# Patient Record
Sex: FEMALE | Race: WHITE | URBAN - METROPOLITAN AREA
[De-identification: names, ages, dates, MRNs, and addresses within clinical notes are randomized per-mention and may not be internally consistent; named-entity substitution may affect disease eponyms.]

---

## 2017-07-06 ENCOUNTER — EMERGENCY (EMERGENCY)
Facility: HOSPITAL | Age: 72
LOS: 1 days | Discharge: HOME | End: 2017-07-06
Admitting: EMERGENCY MEDICINE
Payer: MEDICARE

## 2017-07-06 VITALS — WEIGHT: 151.9 LBS | HEIGHT: 60 IN

## 2017-07-06 VITALS
DIASTOLIC BLOOD PRESSURE: 103 MMHG | SYSTOLIC BLOOD PRESSURE: 169 MMHG | RESPIRATION RATE: 16 BRPM | TEMPERATURE: 98 F | HEART RATE: 107 BPM | OXYGEN SATURATION: 100 %

## 2017-07-06 DIAGNOSIS — Z79.899 OTHER LONG TERM (CURRENT) DRUG THERAPY: ICD-10-CM

## 2017-07-06 DIAGNOSIS — E78.00 PURE HYPERCHOLESTEROLEMIA, UNSPECIFIED: ICD-10-CM

## 2017-07-06 DIAGNOSIS — R07.9 CHEST PAIN, UNSPECIFIED: ICD-10-CM

## 2017-07-06 DIAGNOSIS — I10 ESSENTIAL (PRIMARY) HYPERTENSION: ICD-10-CM

## 2017-07-06 DIAGNOSIS — I25.10 ATHEROSCLEROTIC HEART DISEASE OF NATIVE CORONARY ARTERY WITHOUT ANGINA PECTORIS: ICD-10-CM

## 2017-07-06 DIAGNOSIS — R00.2 PALPITATIONS: ICD-10-CM

## 2017-07-06 LAB
CK SERPL-CCNC: 138 U/L — SIGNIFICANT CHANGE UP (ref 26–192)
TROPONIN I SERPL-MCNC: <0.015 NG/ML — SIGNIFICANT CHANGE UP (ref 0.01–0.04)

## 2017-07-06 PROCEDURE — 71010: CPT | Mod: 26

## 2017-07-06 PROCEDURE — 93010 ELECTROCARDIOGRAM REPORT: CPT

## 2017-07-06 PROCEDURE — 99285 EMERGENCY DEPT VISIT HI MDM: CPT

## 2017-07-06 RX ORDER — ASPIRIN/CALCIUM CARB/MAGNESIUM 324 MG
162 TABLET ORAL ONCE
Qty: 0 | Refills: 0 | Status: DISCONTINUED | OUTPATIENT
Start: 2017-07-06 | End: 2017-07-10

## 2017-07-06 RX ORDER — SODIUM CHLORIDE 9 MG/ML
3 INJECTION INTRAMUSCULAR; INTRAVENOUS; SUBCUTANEOUS ONCE
Qty: 0 | Refills: 0 | Status: DISCONTINUED | OUTPATIENT
Start: 2017-07-06 | End: 2017-07-10

## 2017-09-06 ENCOUNTER — INPATIENT (INPATIENT)
Facility: HOSPITAL | Age: 72
LOS: 0 days | Discharge: HOME HEALTH | End: 2017-09-07
Attending: ORTHOPAEDIC SURGERY | Admitting: ORTHOPAEDIC SURGERY
Payer: MEDICARE

## 2017-09-06 VITALS — WEIGHT: 169.98 LBS | HEIGHT: 66 IN

## 2017-09-06 LAB
HCT VFR BLD CALC: 39.5 % — SIGNIFICANT CHANGE UP (ref 34.5–45)
HGB BLD-MCNC: 13.7 G/DL — SIGNIFICANT CHANGE UP (ref 11.5–15.5)
MCHC RBC-ENTMCNC: 29.2 PG — SIGNIFICANT CHANGE UP (ref 27–34)
MCHC RBC-ENTMCNC: 34.7 GM/DL — SIGNIFICANT CHANGE UP (ref 32–36)
MCV RBC AUTO: 84 FL — SIGNIFICANT CHANGE UP (ref 80–100)
PLATELET # BLD AUTO: 297 K/UL — SIGNIFICANT CHANGE UP (ref 150–400)
RBC # BLD: 4.7 M/UL — SIGNIFICANT CHANGE UP (ref 3.8–5.2)
RBC # FLD: 12.8 % — SIGNIFICANT CHANGE UP (ref 10.3–14.5)
WBC # BLD: 13.1 K/UL — HIGH (ref 3.8–10.5)
WBC # FLD AUTO: 13.1 K/UL — HIGH (ref 3.8–10.5)

## 2017-09-06 PROCEDURE — 71010: CPT | Mod: 26

## 2017-09-06 PROCEDURE — 99222 1ST HOSP IP/OBS MODERATE 55: CPT | Mod: 57

## 2017-09-06 PROCEDURE — 76377 3D RENDER W/INTRP POSTPROCES: CPT | Mod: 26

## 2017-09-06 PROCEDURE — 73590 X-RAY EXAM OF LOWER LEG: CPT | Mod: 26,RT

## 2017-09-06 PROCEDURE — 99285 EMERGENCY DEPT VISIT HI MDM: CPT

## 2017-09-06 PROCEDURE — 27530 TREAT KNEE FRACTURE: CPT | Mod: RT

## 2017-09-06 PROCEDURE — 73564 X-RAY EXAM KNEE 4 OR MORE: CPT | Mod: 26,RT

## 2017-09-06 PROCEDURE — 73610 X-RAY EXAM OF ANKLE: CPT | Mod: 26,RT

## 2017-09-06 PROCEDURE — 73700 CT LOWER EXTREMITY W/O DYE: CPT | Mod: 26,RT

## 2017-09-06 RX ORDER — METOPROLOL TARTRATE 50 MG
25 TABLET ORAL DAILY
Qty: 0 | Refills: 0 | Status: DISCONTINUED | OUTPATIENT
Start: 2017-09-06 | End: 2017-09-07

## 2017-09-06 RX ORDER — METOPROLOL TARTRATE 50 MG
1 TABLET ORAL
Qty: 0 | Refills: 0 | DISCHARGE
Start: 2017-09-06

## 2017-09-06 RX ORDER — SODIUM CHLORIDE 9 MG/ML
1000 INJECTION INTRAMUSCULAR; INTRAVENOUS; SUBCUTANEOUS
Qty: 0 | Refills: 0 | Status: DISCONTINUED | OUTPATIENT
Start: 2017-09-06 | End: 2017-09-07

## 2017-09-06 RX ORDER — PANTOPRAZOLE SODIUM 20 MG/1
40 TABLET, DELAYED RELEASE ORAL
Qty: 0 | Refills: 0 | Status: DISCONTINUED | OUTPATIENT
Start: 2017-09-06 | End: 2017-09-07

## 2017-09-06 RX ORDER — LOSARTAN POTASSIUM 100 MG/1
100 TABLET, FILM COATED ORAL DAILY
Qty: 0 | Refills: 0 | Status: DISCONTINUED | OUTPATIENT
Start: 2017-09-06 | End: 2017-09-07

## 2017-09-06 RX ADMIN — Medication 25 MILLIGRAM(S): at 22:24

## 2017-09-06 NOTE — ED ADULT NURSE REASSESSMENT NOTE - NS ED NURSE REASSESS COMMENT FT1
Received report from Annelise IRIZARRY. Patient resting comfortably,  at bedside. Seen by ortho. Awaiting admission orders. Will continue to monitor.

## 2017-09-06 NOTE — ED STATDOCS - PROGRESS NOTE DETAILS
signed Fabiana Huang PA-C Pt seen initially in intake by Dr Cote.   Pt here for right knee pain after fall at home from standing level a few hours ago. pt has partial paraplegia of bilateral lower extremities and uses crutches and a wheelchair. extensor mechanism intact. pain proximal region of right tibia. No obvious swelling/effusion. Plan xray, pt has appt with Dr rios tomorrow. Pt agrees with plan of  care. signed Fabiana Huang PA-C   Spoke to ortho resident, will see pt in ED. requests CT of lower leg. signed Fabiana Huang PA-C   Pt with ortho residents who request pt be admitted to Dr Alvarado service. Pt agrees with admission and plan of care. As per ortho, no labs at this time. signed Fabiana Huang PA-C  +tibial plateau fx.  Spoke to ortho resident, will see pt in ED. requests CT of lower leg.
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
## 2017-09-07 VITALS
RESPIRATION RATE: 18 BRPM | HEART RATE: 100 BPM | OXYGEN SATURATION: 98 % | DIASTOLIC BLOOD PRESSURE: 67 MMHG | TEMPERATURE: 99 F | SYSTOLIC BLOOD PRESSURE: 98 MMHG

## 2017-09-07 LAB
ANION GAP SERPL CALC-SCNC: 10 MMOL/L — SIGNIFICANT CHANGE UP (ref 5–17)
ANION GAP SERPL CALC-SCNC: 9 MMOL/L — SIGNIFICANT CHANGE UP (ref 5–17)
APTT BLD: 34.6 SEC — SIGNIFICANT CHANGE UP (ref 27.5–37.4)
BLD GP AB SCN SERPL QL: SIGNIFICANT CHANGE UP
BUN SERPL-MCNC: 17 MG/DL — SIGNIFICANT CHANGE UP (ref 7–23)
BUN SERPL-MCNC: 18 MG/DL — SIGNIFICANT CHANGE UP (ref 7–23)
CALCIUM SERPL-MCNC: 8.9 MG/DL — SIGNIFICANT CHANGE UP (ref 8.5–10.1)
CALCIUM SERPL-MCNC: 9.1 MG/DL — SIGNIFICANT CHANGE UP (ref 8.5–10.1)
CHLORIDE SERPL-SCNC: 110 MMOL/L — HIGH (ref 96–108)
CHLORIDE SERPL-SCNC: 110 MMOL/L — HIGH (ref 96–108)
CO2 SERPL-SCNC: 21 MMOL/L — LOW (ref 22–31)
CO2 SERPL-SCNC: 22 MMOL/L — SIGNIFICANT CHANGE UP (ref 22–31)
CREAT SERPL-MCNC: 0.47 MG/DL — LOW (ref 0.5–1.3)
CREAT SERPL-MCNC: 0.57 MG/DL — SIGNIFICANT CHANGE UP (ref 0.5–1.3)
GLUCOSE SERPL-MCNC: 114 MG/DL — HIGH (ref 70–99)
GLUCOSE SERPL-MCNC: 119 MG/DL — HIGH (ref 70–99)
HCT VFR BLD CALC: 36.5 % — SIGNIFICANT CHANGE UP (ref 34.5–45)
HGB BLD-MCNC: 12.4 G/DL — SIGNIFICANT CHANGE UP (ref 11.5–15.5)
INR BLD: 1.12 RATIO — SIGNIFICANT CHANGE UP (ref 0.88–1.16)
MCHC RBC-ENTMCNC: 29 PG — SIGNIFICANT CHANGE UP (ref 27–34)
MCHC RBC-ENTMCNC: 33.9 GM/DL — SIGNIFICANT CHANGE UP (ref 32–36)
MCV RBC AUTO: 85.4 FL — SIGNIFICANT CHANGE UP (ref 80–100)
PLATELET # BLD AUTO: 258 K/UL — SIGNIFICANT CHANGE UP (ref 150–400)
POTASSIUM SERPL-MCNC: 3.5 MMOL/L — SIGNIFICANT CHANGE UP (ref 3.5–5.3)
POTASSIUM SERPL-MCNC: 3.7 MMOL/L — SIGNIFICANT CHANGE UP (ref 3.5–5.3)
POTASSIUM SERPL-SCNC: 3.5 MMOL/L — SIGNIFICANT CHANGE UP (ref 3.5–5.3)
POTASSIUM SERPL-SCNC: 3.7 MMOL/L — SIGNIFICANT CHANGE UP (ref 3.5–5.3)
PROTHROM AB SERPL-ACNC: 12.1 SEC — SIGNIFICANT CHANGE UP (ref 9.8–12.7)
RBC # BLD: 4.28 M/UL — SIGNIFICANT CHANGE UP (ref 3.8–5.2)
RBC # FLD: 12.8 % — SIGNIFICANT CHANGE UP (ref 10.3–14.5)
SODIUM SERPL-SCNC: 141 MMOL/L — SIGNIFICANT CHANGE UP (ref 135–145)
SODIUM SERPL-SCNC: 141 MMOL/L — SIGNIFICANT CHANGE UP (ref 135–145)
TYPE + AB SCN PNL BLD: SIGNIFICANT CHANGE UP
WBC # BLD: 10.1 K/UL — SIGNIFICANT CHANGE UP (ref 3.8–10.5)
WBC # FLD AUTO: 10.1 K/UL — SIGNIFICANT CHANGE UP (ref 3.8–10.5)

## 2017-09-07 PROCEDURE — 93010 ELECTROCARDIOGRAM REPORT: CPT

## 2017-09-07 RX ORDER — ASPIRIN/CALCIUM CARB/MAGNESIUM 324 MG
1 TABLET ORAL
Qty: 60 | Refills: 0
Start: 2017-09-07 | End: 2017-10-07

## 2017-09-07 RX ORDER — DOCUSATE SODIUM 100 MG
100 CAPSULE ORAL THREE TIMES A DAY
Qty: 0 | Refills: 0 | Status: DISCONTINUED | OUTPATIENT
Start: 2017-09-07 | End: 2017-09-07

## 2017-09-07 RX ORDER — POTASSIUM CHLORIDE 20 MEQ
40 PACKET (EA) ORAL ONCE
Qty: 0 | Refills: 0 | Status: COMPLETED | OUTPATIENT
Start: 2017-09-07 | End: 2017-09-07

## 2017-09-07 RX ORDER — DOCUSATE SODIUM 100 MG
1 CAPSULE ORAL
Qty: 14 | Refills: 0 | OUTPATIENT
Start: 2017-09-07 | End: 2017-09-14

## 2017-09-07 RX ORDER — DOCUSATE SODIUM 100 MG
1 CAPSULE ORAL
Qty: 14 | Refills: 0
Start: 2017-09-07 | End: 2017-09-14

## 2017-09-07 RX ORDER — ASPIRIN/CALCIUM CARB/MAGNESIUM 324 MG
325 TABLET ORAL
Qty: 0 | Refills: 0 | Status: DISCONTINUED | OUTPATIENT
Start: 2017-09-07 | End: 2017-09-07

## 2017-09-07 RX ORDER — OXYCODONE HYDROCHLORIDE 5 MG/1
10 TABLET ORAL EVERY 4 HOURS
Qty: 0 | Refills: 0 | Status: DISCONTINUED | OUTPATIENT
Start: 2017-09-07 | End: 2017-09-07

## 2017-09-07 RX ORDER — DIPHENHYDRAMINE HCL 50 MG
25 CAPSULE ORAL AT BEDTIME
Qty: 0 | Refills: 0 | Status: DISCONTINUED | OUTPATIENT
Start: 2017-09-07 | End: 2017-09-07

## 2017-09-07 RX ORDER — ACETAMINOPHEN 500 MG
650 TABLET ORAL EVERY 6 HOURS
Qty: 0 | Refills: 0 | Status: DISCONTINUED | OUTPATIENT
Start: 2017-09-07 | End: 2017-09-07

## 2017-09-07 RX ORDER — ASPIRIN/CALCIUM CARB/MAGNESIUM 324 MG
1 TABLET ORAL
Qty: 60 | Refills: 0 | OUTPATIENT
Start: 2017-09-07 | End: 2017-10-07

## 2017-09-07 RX ORDER — OXYCODONE HYDROCHLORIDE 5 MG/1
5 TABLET ORAL EVERY 4 HOURS
Qty: 0 | Refills: 0 | Status: DISCONTINUED | OUTPATIENT
Start: 2017-09-07 | End: 2017-09-07

## 2017-09-07 RX ORDER — HEPARIN SODIUM 5000 [USP'U]/ML
5000 INJECTION INTRAVENOUS; SUBCUTANEOUS EVERY 8 HOURS
Qty: 0 | Refills: 0 | Status: DISCONTINUED | OUTPATIENT
Start: 2017-09-07 | End: 2017-09-07

## 2017-09-07 RX ORDER — METOCLOPRAMIDE HCL 10 MG
10 TABLET ORAL EVERY 6 HOURS
Qty: 0 | Refills: 0 | Status: DISCONTINUED | OUTPATIENT
Start: 2017-09-07 | End: 2017-09-07

## 2017-09-07 RX ADMIN — Medication 25 MILLIGRAM(S): at 13:17

## 2017-09-07 RX ADMIN — LOSARTAN POTASSIUM 100 MILLIGRAM(S): 100 TABLET, FILM COATED ORAL at 06:39

## 2017-09-07 RX ADMIN — PANTOPRAZOLE SODIUM 40 MILLIGRAM(S): 20 TABLET, DELAYED RELEASE ORAL at 06:39

## 2017-09-07 NOTE — DISCHARGE NOTE ADULT - CONDITIONS AT DISCHARGE
Patient denied any pain. patient refused all her scheduled medications stating she will take her own medications at home.Patient seen by phisical therapy and orthopedic.Patient discharge to home awith

## 2017-09-07 NOTE — DISCHARGE NOTE ADULT - HOSPITAL COURSE
H&P:  This is a 71y/o female admitted to Orthopedics service with a right tibial plateau fracture s/p fall yesterday.     PAST MEDICAL & SURGICAL HISTORY:  HTN  HLD  hx Cauda Equina   Partial Paraplegia    MEDICATIONS  (STANDING):  metoprolol succinate ER 25 milliGRAM(s) Oral daily  losartan 100 milliGRAM(s) Oral daily  pantoprazole    Tablet 40 milliGRAM(s) Oral before breakfast  docusate sodium 100 milliGRAM(s) Oral three times a day  aspirin 325 milliGRAM(s) Oral two times a day    MEDICATIONS  (PRN):  acetaminophen   Tablet 650 milliGRAM(s) Oral every 6 hours PRN For Temp greater than 38 C (100.4 F) and headache  oxyCODONE    IR 10 milliGRAM(s) Oral every 4 hours PRN Severe Pain (7 - 10)  oxyCODONE    IR 5 milliGRAM(s) Oral every 4 hours PRN Moderate Pain (4 - 6)  metoclopramide Injectable 10 milliGRAM(s) IV Push every 6 hours PRN Nausea and/or Vomiting  diphenhydrAMINE   Capsule 25 milliGRAM(s) Oral at bedtime PRN Insomnia      Vital Signs Last 24 Hrs  T(C): 37.4 (09-07-17 @ 11:59), Max: 37.4 (09-07-17 @ 11:59)  T(F): 99.4 (09-07-17 @ 11:59), Max: 99.4 (09-07-17 @ 11:59)  HR: 100 (09-07-17 @ 11:59) (80 - 118)  BP: 98/67 (09-07-17 @ 11:59) (98/67 - 167/86)  BP(mean): --  RR: 18 (09-07-17 @ 11:59) (18 - 19)  SpO2: 98% (09-07-17 @ 11:59) (98% - 100%)                        12.4   10.1  )-----------( 258      ( 07 Sep 2017 05:00 )             36.5     PT/INR - ( 06 Sep 2017 23:50 )   PT: 12.1 sec;   INR: 1.12 ratio         PTT - ( 06 Sep 2017 23:50 )  PTT:34.6 sec    Hospital Course:  Patient presented to [facility] ED on 9/6 s/p fall with right knee pain found to have a right tibial plateau fracture. Fracture was determined to be nonoperative 2' minimal displacement of fracture and hx of partial paraplegia. Pt ambulates with B/L christine braces and B/L AFOs. Pt has a wheelchair at home and lives with her .    Pt was evaluated by PT and able to perform transfers, unable to ambulate. Pt is motivated to return home and says she has a wheelchair at home, able to transfer and her  is home to help assist her. Will discharge patient home with home care, home PT and request for HHA.    The orthopedic Attending is aware and agrees. H&P:  This is a 71y/o female admitted to Orthopedics service with a right tibial plateau fracture s/p fall yesterday.     PAST MEDICAL & SURGICAL HISTORY:  HTN  HLD  hx Cauda Equina   Partial Paraplegia    MEDICATIONS  (STANDING):  metoprolol succinate ER 25 milliGRAM(s) Oral daily  losartan 100 milliGRAM(s) Oral daily  pantoprazole    Tablet 40 milliGRAM(s) Oral before breakfast  docusate sodium 100 milliGRAM(s) Oral three times a day  aspirin 325 milliGRAM(s) Oral two times a day    MEDICATIONS  (PRN):  acetaminophen   Tablet 650 milliGRAM(s) Oral every 6 hours PRN For Temp greater than 38 C (100.4 F) and headache  oxyCODONE    IR 10 milliGRAM(s) Oral every 4 hours PRN Severe Pain (7 - 10)  oxyCODONE    IR 5 milliGRAM(s) Oral every 4 hours PRN Moderate Pain (4 - 6)  metoclopramide Injectable 10 milliGRAM(s) IV Push every 6 hours PRN Nausea and/or Vomiting  diphenhydrAMINE   Capsule 25 milliGRAM(s) Oral at bedtime PRN Insomnia      Vital Signs Last 24 Hrs  T(C): 37.4 (09-07-17 @ 11:59), Max: 37.4 (09-07-17 @ 11:59)  T(F): 99.4 (09-07-17 @ 11:59), Max: 99.4 (09-07-17 @ 11:59)  HR: 100 (09-07-17 @ 11:59) (80 - 118)  BP: 98/67 (09-07-17 @ 11:59) (98/67 - 167/86)  BP(mean): --  RR: 18 (09-07-17 @ 11:59) (18 - 19)  SpO2: 98% (09-07-17 @ 11:59) (98% - 100%)                        12.4   10.1  )-----------( 258      ( 07 Sep 2017 05:00 )             36.5     PT/INR - ( 06 Sep 2017 23:50 )   PT: 12.1 sec;   INR: 1.12 ratio         PTT - ( 06 Sep 2017 23:50 )  PTT:34.6 sec    Hospital Course:  Patient presented to [facility] ED on 9/6 s/p fall with right knee pain found to have a right tibial plateau fracture. Fracture was determined to be nonoperative 2' minimal displacement of fracture and hx of partial paraplegia. Pt ambulates with B/L christine braces and B/L AFOs. Pt has a wheelchair at home and lives with her .    Pt was evaluated by PT and able to perform transfers, unable to ambulate. Pt is motivated to return home and says she has a wheelchair at home, able to transfer and her  is home to help assist her. Will discharge patient home with home care, home PT and request for HHA. Pt will follow-up as outpatient with her private Orthopedist Dr. Padilla.    The orthopedic Attending, Dr. Martinez, aware and agrees.

## 2017-09-07 NOTE — PHYSICAL THERAPY INITIAL EVALUATION ADULT - GENERAL OBSERVATIONS, REHAB EVAL
R knee immobilizer / L wrist brace / Bilat AFO's  donned; pt rec'd in bed supine; denied pain; spouse present

## 2017-09-07 NOTE — DISCHARGE NOTE ADULT - PATIENT PORTAL LINK FT
“You can access the [employer] Patient Portal, offered by [facility], by registering with the following website: [email or web address]”

## 2017-09-07 NOTE — H&P ADULT - ASSESSMENT
73 yo F w/ R proximal tibia fracture s/p fall    Pain control  NPO for possible OR vs Non Operative conservative management  NWB RLE in Bulk Thomas Knee Immobilizer  FU CT Official Read  FU Labs  FU EKG  Admit to ortho floor  will discuss with attending for further reccomendations  ICE PRN for Swelling

## 2017-09-07 NOTE — PHYSICAL THERAPY INITIAL EVALUATION ADULT - MODALITIES TREATMENT COMMENTS
pt left seated @ b/s post Eval @ pt request; NA Hanna in room to toilet pt; spouse present; R KI / Jade AFO's donned; callbell in reach; pt instructed not to get up alone; call nursing for assist; zhanna well; denied pain

## 2017-09-07 NOTE — DISCHARGE NOTE ADULT - CARE PLAN
Principal Discharge DX:	Closed fracture of tibial plateau, right, initial encounter  Goal:	Improved pain, Improved function, heal fracture, return to ADLs  Instructions for follow-up, activity and diet:	Discharge Instructions for Right Tibial Plateau Fracture     1.  Diet: Resume previous diet    2. Activity: NWB RLE with Bobby Brace locked in full extension. Home PT.     3. Call with: fever over 101, severe pain and/or swelling, foot/toes changing color or cold to touch, calf pain/calf swelling    4. DVT PE Prophylaxis:  -Continue Aspirin 325mg PO BID for 30 days.  -Continue Pepcid while on Anticoagulant (Aspirin).    5. Pain control:  -Pain medication as prescribed.  -Ice application.  -RLE elevation.    6. Follow Up: Orthopedics office in 7-10 days. Call to schedule. eRX sent to your pharmacy for . Principal Discharge DX:	Closed fracture of tibial plateau, right, initial encounter  Goal:	Improved pain, Improved function, heal fracture, return to ADLs  Instructions for follow-up, activity and diet:	Discharge Instructions for Right Tibial Plateau Fracture     1.  Diet: Resume previous diet    2. Activity: NWB RLE with Bobby Brace locked in full extension. Home PT.     3. Call with: fever over 101, severe pain and/or swelling, foot/toes changing color or cold to touch, calf pain/calf swelling    4. DVT PE Prophylaxis:  -Continue Aspirin 325mg PO BID for 30 days.  -Continue Pepcid while on Anticoagulant (Aspirin).    5. Pain control:  -Pain medication as prescribed.  -Ice application.  -RLE elevation.    6. Follow Up: Pt will follow-up with her private Orthopedist Dr. Padilla office in 7-10 days. Call to schedule. eRX sent to your pharmacy for .
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
## 2017-09-07 NOTE — PHYSICAL THERAPY INITIAL EVALUATION ADULT - MANUAL MUSCLE TESTING RESULTS, REHAB EVAL
no strength deficits were identified/R LE n/a except ankle PF/DF 0/5; L LE: hip/knee flex 4/5; knee ext 4-/5; ankle PF/DF 0/5

## 2017-09-07 NOTE — DISCHARGE NOTE ADULT - PLAN OF CARE
Improved pain, Improved function, heal fracture, return to ADLs Discharge Instructions for Right Tibial Plateau Fracture     1.  Diet: Resume previous diet    2. Activity: NWB RLE with [place] Brace locked in full extension. Home PT.     3. Call with: fever over 101, severe pain and/or swelling, foot/toes changing color or cold to touch, calf pain/calf swelling    4. DVT PE Prophylaxis:  -Continue Aspirin 325mg PO BID for 30 days.  -Continue Pepcid while on Anticoagulant (Aspirin).    5. Pain control:  -Pain medication as prescribed.  -Ice application.  -RLE elevation.    6. Follow Up: Orthopedics office in 7-10 days. Call to schedule. eRX sent to your pharmacy for . Discharge Instructions for Right Tibial Plateau Fracture     1.  Diet: Resume previous diet    2. Activity: NWB RLE with Bobby Brace locked in full extension. Home PT.     3. Call with: fever over 101, severe pain and/or swelling, foot/toes changing color or cold to touch, calf pain/calf swelling    4. DVT PE Prophylaxis:  -Continue Aspirin 325mg PO BID for 30 days.  -Continue Pepcid while on Anticoagulant (Aspirin).    5. Pain control:  -Pain medication as prescribed.  -Ice application.  -RLE elevation.    6. Follow Up: Pt will follow-up with her private Orthopedist Dr. Padilla office in 7-10 days. Call to schedule. eRX sent to your pharmacy for .

## 2017-09-07 NOTE — PROGRESS NOTE ADULT - ASSESSMENT
73 yo F w/ R proximal tibia fracture s/p fall    Pain control  Non Operative conservative management  NWB RLE in Bulky Thomas Knee Immobilizer  ICE PRN for Swelling   BID  Patient to follow up with Dr. Padilla for definitive management as an outpatient.  All questions answered.

## 2017-09-07 NOTE — DISCHARGE NOTE ADULT - MEDICATION SUMMARY - MEDICATIONS TO TAKE
I will START or STAY ON the medications listed below when I get home from the hospital:    losartan 100 mg oral tablet  -- 1 tab(s) by mouth once a day  -- Indication: For home medication    metoprolol succinate 25 mg oral tablet, extended release  -- 1 tab(s) by mouth once a day **Patient was taking metoprolol tartrate 12.5 twice daily up until 9/6/17**  -- Indication: For home medication    Fish Oil 1000 mg oral capsule  -- 2 cap(s) by mouth once a day  -- Indication: For home medication    Systane Balance  -- 1 drop(s) in the left eye 3 times a day, As Needed for dry eyes  -- Indication: For home medication    omeprazole 20 mg oral delayed release capsule  -- 1 cap(s) by mouth once a day  -- Indication: For home medication; GI prophylaxis    B Complex 50  -- 1 tab(s) by mouth once a day  -- Indication: For home medication I will START or STAY ON the medications listed below when I get home from the hospital:    Ecotrin 325 mg oral delayed release tablet  -- 1 tab(s) by mouth 2 times a day for blood clot prevention  -- Swallow whole.  Do not crush.  Take with food or milk.    -- Indication: For blood clot prevention    oxycodone-acetaminophen 5mg-325mg oral tablet  -- 1 tab(s) by mouth every 4 hours, As Needed -for severe pain MDD:6  -- Caution federal law prohibits the transfer of this drug to any person other  than the person for whom it was prescribed.  May cause drowsiness.  Alcohol may intensify this effect.  Use care when operating dangerous machinery.  This prescription cannot be refilled.  This product contains acetaminophen.  Do not use  with any other product containing acetaminophen to prevent possible liver damage.  Using more of this medication than prescribed may cause serious breathing problems.    -- Indication: For pain control    losartan 100 mg oral tablet  -- 1 tab(s) by mouth once a day  -- Indication: For home medication    metoprolol succinate 25 mg oral tablet, extended release  -- 1 tab(s) by mouth once a day **Patient was taking metoprolol tartrate 12.5 twice daily up until 9/6/17**  -- Indication: For home medication    Colace 100 mg oral capsule  -- 1 cap(s) by mouth 2 times a day while taking percocet  -- Medication should be taken with plenty of water.    -- Indication: For constipation prophylaxis while on opiates    Fish Oil 1000 mg oral capsule  -- 2 cap(s) by mouth once a day  -- Indication: For home medication    Systane Balance  -- 1 drop(s) in the left eye 3 times a day, As Needed for dry eyes  -- Indication: For home medication    omeprazole 20 mg oral delayed release capsule  -- 1 cap(s) by mouth once a day  -- Indication: For home medication; GI prophylaxis    B Complex 50  -- 1 tab(s) by mouth once a day  -- Indication: For home medication
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
## 2017-09-12 DIAGNOSIS — E78.5 HYPERLIPIDEMIA, UNSPECIFIED: ICD-10-CM

## 2017-09-12 DIAGNOSIS — S82.141A DISPLACED BICONDYLAR FRACTURE OF RIGHT TIBIA, INITIAL ENCOUNTER FOR CLOSED FRACTURE: ICD-10-CM
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
## 2017-09-12 DIAGNOSIS — I10 ESSENTIAL (PRIMARY) HYPERTENSION: ICD-10-CM

## 2017-09-12 DIAGNOSIS — G82.20 PARAPLEGIA, UNSPECIFIED: ICD-10-CM
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
## 2019-09-10 PROBLEM — G83.4 CAUDA EQUINA SYNDROME: Chronic | Status: ACTIVE | Noted: 2017-09-07

## 2019-10-31 ENCOUNTER — APPOINTMENT (OUTPATIENT)
Dept: DERMATOLOGY | Facility: CLINIC | Age: 74
End: 2019-10-31
Payer: MEDICARE

## 2019-10-31 VITALS — WEIGHT: 155 LBS | BODY MASS INDEX: 30.43 KG/M2 | HEIGHT: 60 IN

## 2019-10-31 DIAGNOSIS — L82.1 OTHER SEBORRHEIC KERATOSIS: ICD-10-CM

## 2019-10-31 DIAGNOSIS — L81.4 OTHER MELANIN HYPERPIGMENTATION: ICD-10-CM

## 2019-10-31 DIAGNOSIS — D22.9 MELANOCYTIC NEVI, UNSPECIFIED: ICD-10-CM

## 2019-10-31 DIAGNOSIS — Z87.2 PERSONAL HISTORY OF DISEASES OF THE SKIN AND SUBCUTANEOUS TISSUE: ICD-10-CM

## 2019-10-31 DIAGNOSIS — Z00.00 ENCOUNTER FOR GENERAL ADULT MEDICAL EXAMINATION W/OUT ABNORMAL FINDINGS: ICD-10-CM

## 2019-10-31 DIAGNOSIS — D18.01 HEMANGIOMA OF SKIN AND SUBCUTANEOUS TISSUE: ICD-10-CM

## 2019-10-31 DIAGNOSIS — L72.3 SEBACEOUS CYST: ICD-10-CM

## 2019-10-31 PROCEDURE — 99203 OFFICE O/P NEW LOW 30 MIN: CPT

## 2019-10-31 RX ORDER — LOSARTAN POTASSIUM 100 MG/1
100 TABLET, FILM COATED ORAL
Refills: 0 | Status: ACTIVE | COMMUNITY

## 2019-10-31 RX ORDER — OMEPRAZOLE 20 MG/1
20 CAPSULE, DELAYED RELEASE ORAL
Refills: 0 | Status: ACTIVE | COMMUNITY

## 2019-10-31 NOTE — HISTORY OF PRESENT ILLNESS
[FreeTextEntry1] : Patient presents for skin examination. [de-identified] : Notes lesion of the right pubic region.  Present for years, but increasing in size over the past couple of months.  No drainage.  Lesion has been irritated.  No self tx.

## 2019-10-31 NOTE — PHYSICAL EXAM
[Alert] : alert [Oriented x 3] : ~L oriented x 3 [Well Nourished] : well nourished [Full Body Skin Exam Performed] : performed [FreeTextEntry3] : A full skin exam was performed including the scalp, face, neck, chest, abdomen, back, buttocks, upper extremities and lower extremities.  The patient declined examination of the breasts and genitalia.  \par The exam did not reveal any evidence of skin cancer, showing only the following benign growths:\par Keller pigmented nevi.\par Seborrheic keratoses.\par Lentigines.\par Cherry angioma.\par \par Cystic nodule with central comedonal openings x 2 - right pubic region.

## 2019-10-31 NOTE — ASSESSMENT
[FreeTextEntry1] : A complete skin examination was performed.  There is no evidence of skin cancer.  We discussed the importance of photoprotection, including the use of hats, protective clothing and sunscreens with an SPF of at least 30.  Sun avoidance was also discussed.\par \par Inflamed cyst - right pubic region.\par Benign.\par Will remove at patient is bothered by lesion.

## 2020-07-04 DIAGNOSIS — Z01.818 ENCOUNTER FOR OTHER PREPROCEDURAL EXAMINATION: ICD-10-CM

## 2020-07-05 ENCOUNTER — APPOINTMENT (OUTPATIENT)
Dept: DISASTER EMERGENCY | Facility: CLINIC | Age: 75
End: 2020-07-05

## 2020-07-05 LAB — SARS-COV-2 N GENE NPH QL NAA+PROBE: NOT DETECTED

## 2020-07-07 ENCOUNTER — FORM ENCOUNTER (OUTPATIENT)
Age: 75
End: 2020-07-07

## 2020-07-07 RX ORDER — OMEGA-3 ACID ETHYL ESTERS 1 G
2 CAPSULE ORAL
Qty: 0 | Refills: 0 | DISCHARGE

## 2020-07-07 NOTE — H&P ADULT - ASSESSMENT
74 y/o female with PMHx of CAD, HTN, HLD, Cardiac murmur presented to cardiology for c/o SOB, CABRERA, and dizziness. Underwent nuclear PET 6/25/20 suggesting ischemia to LAD and RCA. Started on Toprol to medical manage. Referred for cardiac catheterization for further evaluation. COVID PST negative.     ASA class:  Creatinine:  GFR:  Bleeding  Risk score: 74 y/o female with PMHx of CAD, HTN, HLD, Cardiac murmur presented to cardiology for c/o SOB, CABRERA, and dizziness. Underwent nuclear PET 6/25/20 suggesting ischemia to LAD and RCA. Started on Toprol to medical manage. Referred for cardiac catheterization for further evaluation. COVID PST negative.     ASA class:II  Creatinine:0.78  GFR:78  Bleeding  Risk score:1.2%

## 2020-07-07 NOTE — H&P ADULT - NSHPPHYSICALEXAM_GEN_ALL_CORE
PHYSICAL EXAM  GENERAL: NAD, AAOx3  HEAD:  Atraumatic, Normocephalic  EYES: EOMI, PERRLA, conjunctiva and sclera clear  NECK: Supple, No JVD, No LAD  CHEST/LUNG: Clear to auscultation bilaterally; No wheeze  HEART: s1 s2 Regular rate and rhythm; No murmurs, rubs, or gallops  ABDOMEN: Soft, Nontender, Nondistended; Bowel sounds present X 4 quadrants   EXTREMITIES:  2+ Peripheral Pulses, No clubbing, cyanosis, or edema  SKIN: No rashes or lesions,  b/l LE not red, cool to touch,  no open skin no drainage  NEURO: nonfocal CN/motor/sensory/reflexes  Psych: normal affect and behavior, calm and cooperative PHYSICAL EXAM  GENERAL: NAD, AAOx3  HEAD:  Atraumatic, Normocephalic  EYES: EOMI, PERRLA, conjunctiva and sclera clear  NECK: Supple, No JVD, No LAD  CHEST/LUNG: Clear to auscultation bilaterally; No wheeze  HEART: s1 s2 Regular rate and rhythm; + murmurs  ABDOMEN: Soft, Nontender, Nondistended; Bowel sounds present X 4 quadrants   EXTREMITIES:  2+ Peripheral Pulses, No clubbing, cyanosis. Trace pedal edema-chronic  SKIN: No rashes or lesions,  b/l LE not red, cool to touch,  no open skin no drainage  NEURO: nonfocal CN/motor/sensory/reflexes  Psych: normal affect and behavior, calm and cooperative

## 2020-07-07 NOTE — H&P ADULT - NSHPLABSRESULTS_GEN_ALL_CORE
EKG- 7/6/2020 NSR, no ST T changes of ischemia or infarction. rate 78bpm  STRESS 6/25/2020- suggestive of LAD and RCA ischemia

## 2020-07-07 NOTE — H&P ADULT - NSICDXPASTMEDICALHX_GEN_ALL_CORE_FT
PAST MEDICAL HISTORY:  Cardiac murmur s4, softy systolic murmur LLSB and ANNEMARIE    Cauda equina compression     HLD (hyperlipidemia)     HTN (hypertension)

## 2020-07-07 NOTE — H&P ADULT - PROBLEM SELECTOR PLAN 1
-plan for cardiac cath with possible PCI   -Consent obtained for cardiac catheterization w/ coronary angiogram and possible stent placement. Pt is competent, has capacity, and understands risks and benefits of procedure. Risks and benefits discussed. Risk discussed included, but not limited to MI, stroke, mortality, major bleeding, arrythmia, or infection. All questions answered
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
## 2020-07-07 NOTE — H&P ADULT - HISTORY OF PRESENT ILLNESS
74 y/o female with PMHx of CAD, HTN, HLD, Cardiac murmur presented to cardiology for c/o SOB, CABRERA, and dizziness. Underwent nuclear PET 6/25/20 suggesting ischemia to LAD and RCA. Started on Toprol to medical manage. Referred for cardiac catheterization for further evaluation. COVID PST negative. 76 y/o female with PMHx of CAD, HTN, HLD, Cardiac murmur presented to cardiology for c/o SOB, CABRERA, intermittent  chest tightness and dizziness. Underwent nuclear PET 6/25/20 suggesting ischemia to LAD and RCA. Started on Toprol to medical manage. Referred for cardiac catheterization for further evaluation. COVID PST negative.

## 2020-07-08 ENCOUNTER — OUTPATIENT (OUTPATIENT)
Dept: OUTPATIENT SERVICES | Facility: HOSPITAL | Age: 75
LOS: 1 days | Discharge: HOME | End: 2020-07-08
Payer: MEDICARE

## 2020-07-08 VITALS
SYSTOLIC BLOOD PRESSURE: 140 MMHG | WEIGHT: 149.91 LBS | DIASTOLIC BLOOD PRESSURE: 72 MMHG | OXYGEN SATURATION: 100 % | TEMPERATURE: 97 F | HEIGHT: 60 IN | RESPIRATION RATE: 18 BRPM | HEART RATE: 82 BPM

## 2020-07-08 VITALS — HEART RATE: 88 BPM | SYSTOLIC BLOOD PRESSURE: 119 MMHG | DIASTOLIC BLOOD PRESSURE: 79 MMHG

## 2020-07-08 DIAGNOSIS — R94.39 ABNORMAL RESULT OF OTHER CARDIOVASCULAR FUNCTION STUDY: ICD-10-CM

## 2020-07-08 DIAGNOSIS — Z98.890 OTHER SPECIFIED POSTPROCEDURAL STATES: Chronic | ICD-10-CM

## 2020-07-08 DIAGNOSIS — I20.8 OTHER FORMS OF ANGINA PECTORIS: ICD-10-CM

## 2020-07-08 DIAGNOSIS — R07.89 OTHER CHEST PAIN: ICD-10-CM

## 2020-07-08 DIAGNOSIS — I10 ESSENTIAL (PRIMARY) HYPERTENSION: ICD-10-CM

## 2020-07-08 DIAGNOSIS — I25.10 ATHEROSCLEROTIC HEART DISEASE OF NATIVE CORONARY ARTERY WITHOUT ANGINA PECTORIS: ICD-10-CM

## 2020-07-08 DIAGNOSIS — R93.1 ABNORMAL FINDINGS ON DIAGNOSTIC IMAGING OF HEART AND CORONARY CIRCULATION: ICD-10-CM

## 2020-07-08 DIAGNOSIS — Z79.82 LONG TERM (CURRENT) USE OF ASPIRIN: ICD-10-CM

## 2020-07-08 DIAGNOSIS — Z82.49 FAMILY HISTORY OF ISCHEMIC HEART DISEASE AND OTHER DISEASES OF THE CIRCULATORY SYSTEM: ICD-10-CM

## 2020-07-08 DIAGNOSIS — R06.02 SHORTNESS OF BREATH: ICD-10-CM

## 2020-07-08 DIAGNOSIS — E78.5 HYPERLIPIDEMIA, UNSPECIFIED: ICD-10-CM

## 2020-07-08 DIAGNOSIS — Z88.2 ALLERGY STATUS TO SULFONAMIDES: ICD-10-CM

## 2020-07-08 PROCEDURE — C1887: CPT

## 2020-07-08 PROCEDURE — C1725: CPT

## 2020-07-08 PROCEDURE — C1769: CPT

## 2020-07-08 PROCEDURE — 93458 L HRT ARTERY/VENTRICLE ANGIO: CPT

## 2020-07-08 PROCEDURE — 93458 L HRT ARTERY/VENTRICLE ANGIO: CPT | Mod: 26

## 2020-07-08 RX ORDER — LOSARTAN POTASSIUM 100 MG/1
1 TABLET, FILM COATED ORAL
Qty: 0 | Refills: 0 | DISCHARGE

## 2020-07-08 RX ORDER — DENOSUMAB 60 MG/ML
0 INJECTION SUBCUTANEOUS
Qty: 0 | Refills: 0 | DISCHARGE

## 2020-07-08 RX ORDER — ASPIRIN/CALCIUM CARB/MAGNESIUM 324 MG
1 TABLET ORAL
Qty: 0 | Refills: 0 | DISCHARGE

## 2020-07-08 RX ORDER — DIPHENOXYLATE HCL/ATROPINE 2.5-.025MG
0 TABLET ORAL
Qty: 0 | Refills: 0 | DISCHARGE

## 2020-07-08 RX ORDER — OMEGA-3 ACID ETHYL ESTERS 1 G
1 CAPSULE ORAL
Qty: 0 | Refills: 0 | DISCHARGE

## 2020-07-08 RX ORDER — OMEPRAZOLE 10 MG/1
1 CAPSULE, DELAYED RELEASE ORAL
Qty: 0 | Refills: 0 | DISCHARGE

## 2020-07-08 NOTE — PROGRESS NOTE ADULT - SUBJECTIVE AND OBJECTIVE BOX
s/p LHC revealing non obstructive CAD  Denies chest pain, shortness of breath, dizziness or palpitations at this time    A+Ox3  CV: S1S2 reg  Respiratory: CTAB  Right groin procedure site CDI.  no bleeding, no hematoma, site soft, non tender, positive pedal pulses bilaterally      T(C): 36.3 (08 Jul 2020 10:47), Max: 36.3 (08 Jul 2020 10:47)  T(F): 97.3 (08 Jul 2020 10:47), Max: 97.3 (08 Jul 2020 10:47)  HR: 91 (08 Jul 2020 16:05) (82 - 91)  BP: 134/68 (08 Jul 2020 16:05) (134/68 - 149/74)  BP(mean): 81 (08 Jul 2020 16:05) (81 - 81)  RR: 16 (08 Jul 2020 16:05) (16 - 18)  SpO2: 100% (08 Jul 2020 15:50) (100% - 100%)      HPI:  74 y/o female with PMHx of CAD, HTN, HLD, Cardiac murmur presented to cardiology for c/o SOB, CABRERA, intermittent  chest tightness and dizziness. Underwent nuclear PET 6/25/20 suggesting ischemia to LAD and RCA. Started on Toprol to medical manage. Referred for cardiac catheterization for further evaluation. COVID PST negative. (07 Jul 2020 14:42)    now s/p LHC revealing normal coronaries    -encourage PO fluids  -plan of care discussed with patient and MD  -d/c after bedrest if stable  -post procedure and d/c instructions reviewed  -follow up with MD in 1-2 weeks  -Discussed therapeutic lifestyle changes to reduce risk factors such as following a cardiac diet, weight loss, maintaining a healthy weight, exercise, smoking cessation, medication compliance, and regular follow-up  with MD